# Patient Record
Sex: FEMALE | Race: WHITE | Employment: FULL TIME | URBAN - METROPOLITAN AREA
[De-identification: names, ages, dates, MRNs, and addresses within clinical notes are randomized per-mention and may not be internally consistent; named-entity substitution may affect disease eponyms.]

---

## 2017-11-02 ENCOUNTER — OFFICE VISIT (OUTPATIENT)
Dept: CARDIOLOGY CLINIC | Age: 27
End: 2017-11-02

## 2017-11-02 VITALS
OXYGEN SATURATION: 98 % | HEART RATE: 76 BPM | BODY MASS INDEX: 20.83 KG/M2 | HEIGHT: 65 IN | SYSTOLIC BLOOD PRESSURE: 108 MMHG | DIASTOLIC BLOOD PRESSURE: 64 MMHG | RESPIRATION RATE: 16 BRPM | WEIGHT: 125 LBS

## 2017-11-02 DIAGNOSIS — R00.2 PALPITATIONS: ICD-10-CM

## 2017-11-02 DIAGNOSIS — R55 SYNCOPE, UNSPECIFIED SYNCOPE TYPE: Primary | ICD-10-CM

## 2017-11-02 DIAGNOSIS — R55 VASOVAGAL SYNCOPE: ICD-10-CM

## 2017-11-02 DIAGNOSIS — Z72.0 TOBACCO USE: ICD-10-CM

## 2017-11-02 PROBLEM — G44.89 OTHER HEADACHE SYNDROME: Status: ACTIVE | Noted: 2017-11-02

## 2017-11-02 RX ORDER — BUTALBITAL, ACETAMINOPHEN AND CAFFEINE 300; 40; 50 MG/1; MG/1; MG/1
CAPSULE ORAL
COMMUNITY
Start: 2017-11-01 | End: 2021-07-04

## 2017-11-02 RX ORDER — FLUDROCORTISONE ACETATE 0.1 MG/1
0.1 TABLET ORAL DAILY
Qty: 60 TAB | Refills: 5 | Status: SHIPPED | OUTPATIENT
Start: 2017-11-02

## 2017-11-02 NOTE — PROGRESS NOTES
HISTORY OF PRESENT ILLNESS  Loki William is a 32 y.o. female. HPI    Patient presents for a new office visit. She has a past medical history of what sounds like vasovagal syncope dating back for at least 18 years. She states that when she was a preteen and teenager the episodes would occur every few years, with a similar prodrome of lightheadedness warmth, diaphoresis and a ringing in her ears. She states she would lose consciousness for less than 1 minute each time. More recently over the past 2 years the episodes have become more frequent. She now recognizes the warning signs, so only rarely passes out completely, but does have several near syncopal spells each week. The symptoms tend to worsen when she is changing position such as going from sitting to standing or bending over. She also has been complaining of heart palpitations over the past year or 2 which she describes as her heart racing and often skipping beats. She cannot tell of the near syncopal episodes are associated, but feels they may be separate episodes. She also complains of intermittent headaches. She denies any exertional symptoms such as exertional chest tightness shortness of breath or exertional syncope. No leg swelling, no orthopnea, no PND. She is currently wearing a 48 hour Holter monitor that was fitted earlier this week by her PCP. She feels she underwent a cardiac workup when she was a teenager which was unremarkable. Past Medical History:   Diagnosis Date    Headache     Syncope      Current Outpatient Prescriptions   Medication Sig Dispense Refill    butalbital-acetaminophen-caff (FIORICET) -40 mg per capsule       fludrocortisone (FLORINEF) 0.1 mg tablet Take 1 Tab by mouth daily.  60 Tab 5     No Known Allergies     Social History   Substance Use Topics    Smoking status: Current Every Day Smoker     Packs/day: 0.25     Years: 3.00    Smokeless tobacco: Never Used    Alcohol use No     History reviewed. No pertinent family history. Review of Systems   Constitutional: Negative for chills, fever and weight loss. HENT: Negative for nosebleeds. Eyes: Negative for blurred vision and double vision. Respiratory: Negative for cough, shortness of breath and wheezing. Cardiovascular: Positive for palpitations. Negative for chest pain, orthopnea, claudication, leg swelling and PND. Gastrointestinal: Negative for abdominal pain, heartburn, nausea and vomiting. Genitourinary: Negative for dysuria and hematuria. Musculoskeletal: Negative for falls and myalgias. Skin: Negative for rash. Neurological: Positive for dizziness, loss of consciousness and headaches. Negative for focal weakness. Endo/Heme/Allergies: Does not bruise/bleed easily. Psychiatric/Behavioral: Negative for substance abuse. Visit Vitals    /64 (BP 1 Location: Right arm)    Pulse 76    Resp 16    Ht 5' 5\" (1.651 m)    Wt 56.7 kg (125 lb)    SpO2 98%    BMI 20.8 kg/m2       Physical Exam   Constitutional: She is oriented to person, place, and time. She appears well-developed and well-nourished. HENT:   Head: Normocephalic and atraumatic. Eyes: Conjunctivae are normal.   Neck: Neck supple. No JVD present. Carotid bruit is not present. Cardiovascular: Normal rate, regular rhythm, S1 normal, S2 normal and normal pulses. Exam reveals no gallop. No murmur heard. Pulmonary/Chest: Effort normal and breath sounds normal. She has no wheezes. She has no rales. Abdominal: Soft. Bowel sounds are normal. There is no tenderness. Musculoskeletal: She exhibits no edema, tenderness or deformity. Neurological: She is alert and oriented to person, place, and time. Skin: Skin is warm and dry. Extensive tattoos   Psychiatric: She has a normal mood and affect.  Her behavior is normal. Thought content normal.     EKG: Normal sinus rhythm, normal axis, borderline poor R-wave progression, no ST-T wave abnormalities concerning for ischemia. Normal QTc interval.  Otherwise normal tracing. No change compared to the previous EKG. ASSESSMENT and PLAN  Encounter Diagnoses   Name Primary?  Syncope, unspecified syncope type Yes    Palpitations     Vasovagal syncope     Tobacco use      Recurrent syncope and near syncope. I suspect this is still likely vasovagal, but based on her history, she may have an orthostatic component. I recommended a trial of Florinef 0.1 mg daily and have encouraged the patient to stay well-hydrated and add salt to her diet. I will review the results of her Holter monitor when it is completed. I would also like to arrange for an echocardiogram to rule out any structural heart disease. Palpitations. The patient may be having a reflex tachycardia. She states she notices the symptoms several times a day. She was given a prescription of long-acting propanolol by her PCP, but is yet to start this medication. I will review her Holter monitor when completed. For now I would not start the beta-blocker until the Holter monitor is completed. Tobacco use disorder. Patient only smokes a pack of cigarettes a week. She was encouraged to consider complete smoking cessation. Follow-up in 6 weeks, sooner if needed.

## 2017-11-02 NOTE — PROGRESS NOTES
1. Have you been to the ER, urgent care clinic since your last visit? Hospitalized since your last visit? no  2. Have you seen or consulted any other health care providers outside of the [address] [place] [address] since your last visit? Include any pap smears or colon screening.   no

## 2017-11-02 NOTE — MR AVS SNAPSHOT
Visit Information Date & Time Provider Department Dept. Phone Encounter #  
 11/2/2017  8:00 AM Axel Rene MD [facility] [phone] Your Appointments 12/11/2017  8:40 AM  
Follow Up with Axel Rene MD  
[facility] ([facility]) Appt Note: 6-8 month f/up [place] [address] [address] [address] [phone] [address] [place] [address] [address] Upcoming Health Maintenance Date Due DTaP/Tdap/Td series (1 - Tdap) 5/6/2009 PAP AKA CERVICAL CYTOLOGY 5/6/2009 INFLUENZA AGE 9 TO ADULT 8/1/2017 Allergies as of 11/2/2017  Review Complete On: 11/2/2017 By: Cony Galarza No Known Allergies Current Immunizations  Never Reviewed No immunizations on file. Not reviewed this visit You Were Diagnosed With   
  
 Codes Comments Palpitations    -  Primary ICD-10-CM: R00.2 ICD-9-CM: 785.1 Syncope, unspecified syncope type     ICD-10-CM: R55 
ICD-9-CM: 780. 2 Vitals BP Pulse Resp Height(growth percentile) Weight(growth percentile) SpO2  
 108/64 (BP 1 Location: Right arm) 76 16 5' 5\" (1.651 m) 125 lb (56.7 kg) 98% BMI Smoking Status 20.8 kg/m2 Current Every Day Smoker Vitals History BMI and BSA Data Body Mass Index Body Surface Area  
 20.8 kg/m 2 1.61 m 2 Preferred Pharmacy Pharmacy Name Phone Marty Ortiz [facility] [phone] Your Updated Medication List  
  
   
This list is accurate as of: 11/2/17  8:53 AM.  Always use your most recent med list.  
  
  
  
  
 butalbital-acetaminophen-caff -40 mg per capsule Commonly known as:  [facility] We Performed the Following AMB POC EKG ROUTINE W/ 12 LEADS, SCREEN () [ [facility]] To-Do List   
 11/02/2017 ECHO:  2D ECHO COMPLETE ADULT (TTE) W OR WO CONTR   
  
 11/20/2017 10:00 AM  
  Appointment with [facility]- IE33 MACHINE (WT ) at [facility] NON-INVASIVE CARD ([phone]) Age Limit for ALL Heart procedures @ all [facility] facilities: 18 yrs and older only. Under the age of 25, refer to [address] ([phone]). Wt Limit: 350lbs. This study requires patient to bring a written physician's order or MD office may fax the order to Central Scheduling at [phone]. Patient needs to bring a current list of all medications. No preparation is required for this study. Patients should report 15 minutes prior to their appointment time to the [facility], [address] [place] [address]/[address]. Introducing [facility]! [facility] introduces [employer] Current patient portal. Now you can access parts of your medical record, email your doctor's office, and request medication refills online. 1. In your internet browser, go to [email or web address] 2. Click on the First Time User? Click Here link in the Sign In box. You will see the New Member Sign Up page. 3. Enter your [employer] Current Access Code exactly as it appears below. You will not need to use this code after youve completed the sign-up process. If you do not sign up before the expiration date, you must request a new code. · [employer] Current Access Code: [email or web address] Expires: 1/31/2018  7:24 AM 
 
4. Enter the last four digits of your Social Security Number (xxxx) and Date of Birth (mm/dd/yyyy) as indicated and click Submit. You will be taken to the next sign-up page. 5. Create a Cross Current ID. This will be your Cross Current login ID and cannot be changed, so think of one that is secure and easy to remember. 6. Create a [employer] Current password. You can change your password at any time. 7. Enter your Password Reset Question and Answer. This can be used at a later time if you forget your password. 8. Enter your e-mail address. You will receive e-mail notification when new information is available in [address]. 9. Click Sign Up. You can now view and download portions of your medical record. 10. Click the Download Summary menu link to download a portable copy of your medical information. If you have questions, please visit the Frequently Asked Questions section of the [employer] website. Remember, [employer] is NOT to be used for urgent needs. For medical emergencies, dial 911. Now available from your iPhone and Android! Please provide this summary of care documentation to your next provider. Your primary care clinician is listed as Marycruz Floyd. If you have any questions after today's visit, please call [phone].

## 2017-11-10 ENCOUNTER — TELEPHONE (OUTPATIENT)
Dept: CARDIOLOGY CLINIC | Age: 27
End: 2017-11-10

## 2017-11-10 NOTE — TELEPHONE ENCOUNTER
Dr Himanshu Carpio reviewed results. Holter results are unremarkable. Patient have ECHO on  11/20/2017 and then follow up on 12/11/2017. Patient needs to contact us if the symptoms will progress. Attempted to call Bud Brady, their office is on lunch break.

## 2017-11-10 NOTE — TELEPHONE ENCOUNTER
Called patient. Verified  and full name. Informed about results  per Dr Haven Samuel. Patient verbalized understanding and agreed with the plan of care.

## 2017-11-10 NOTE — TELEPHONE ENCOUNTER
Ishan Buchanan from [address] [place] [address] ([phone]) called today wanted to make sure our office received fax of patient's abn holter results for Dr. Nicole Hurley to review. Ishan Buchanan request Dr. Mere Klein nurse call them back with Dr. Naomy Vargas for the patient. Copy of holter results received for Dr. Nicole Hurley to review he will be in office this afternoon.  Mahamed Miller

## 2021-07-04 PROBLEM — I48.91 ATRIAL FIBRILLATION WITH RAPID VENTRICULAR RESPONSE (HCC): Status: ACTIVE | Noted: 2021-07-04

## 2022-03-18 PROBLEM — Z72.0 TOBACCO USE: Status: ACTIVE | Noted: 2017-11-02

## 2022-03-19 PROBLEM — R55 VASOVAGAL SYNCOPE: Status: ACTIVE | Noted: 2017-11-02

## 2022-03-19 PROBLEM — I48.91 ATRIAL FIBRILLATION WITH RAPID VENTRICULAR RESPONSE (HCC): Status: ACTIVE | Noted: 2021-07-04

## 2022-03-20 PROBLEM — G44.89 OTHER HEADACHE SYNDROME: Status: ACTIVE | Noted: 2017-11-02

## 2023-01-31 RX ORDER — FLUDROCORTISONE ACETATE 0.1 MG/1
100 TABLET ORAL DAILY
COMMUNITY
Start: 2017-11-02

## 2023-01-31 RX ORDER — ALBUTEROL SULFATE 90 UG/1
2 AEROSOL, METERED RESPIRATORY (INHALATION) EVERY 4 HOURS PRN
COMMUNITY

## 2023-01-31 RX ORDER — METHOCARBAMOL 750 MG/1
750 TABLET, FILM COATED ORAL 4 TIMES DAILY
COMMUNITY
Start: 2022-03-09

## 2023-01-31 RX ORDER — IBUPROFEN 800 MG/1
800 TABLET ORAL EVERY 8 HOURS PRN
COMMUNITY
Start: 2022-03-09

## 2023-01-31 RX ORDER — METOPROLOL SUCCINATE 25 MG/1
25 TABLET, EXTENDED RELEASE ORAL DAILY
COMMUNITY
Start: 2021-07-05